# Patient Record
Sex: MALE | Race: ASIAN | NOT HISPANIC OR LATINO | Employment: FULL TIME | URBAN - METROPOLITAN AREA
[De-identification: names, ages, dates, MRNs, and addresses within clinical notes are randomized per-mention and may not be internally consistent; named-entity substitution may affect disease eponyms.]

---

## 2020-07-06 ENCOUNTER — HOSPITAL ENCOUNTER (OUTPATIENT)
Dept: GENERAL RADIOLOGY | Facility: HOSPITAL | Age: 50
Discharge: HOME | End: 2020-07-06
Admitting: INTERNAL MEDICINE

## 2020-07-06 ENCOUNTER — TRANSCRIBE ORDERS (OUTPATIENT)
Dept: ADMINISTRATIVE | Facility: HOSPITAL | Age: 50
End: 2020-07-06

## 2020-07-06 DIAGNOSIS — R09.1 PLEURISY: ICD-10-CM

## 2020-07-06 DIAGNOSIS — R09.1 PLEURISY: Primary | ICD-10-CM

## 2020-07-06 DIAGNOSIS — R42 VERTIGO: ICD-10-CM

## 2020-07-06 PROCEDURE — 71046 X-RAY EXAM CHEST 2 VIEWS: CPT

## 2020-07-30 ENCOUNTER — HOSPITAL ENCOUNTER (OUTPATIENT)
Dept: CARDIOLOGY | Facility: HOSPITAL | Age: 50
Discharge: HOME | End: 2020-07-30
Admitting: INTERNAL MEDICINE

## 2020-07-30 DIAGNOSIS — R42 VERTIGO: ICD-10-CM

## 2020-07-30 LAB
BH CV XLRA MEAS LEFT CCA RATIO VEL: 77.9 CM/SEC
BH CV XLRA MEAS LEFT DIST CCA EDV: 24.1 CM/SEC
BH CV XLRA MEAS LEFT DIST CCA PSV: 62.6 CM/SEC
BH CV XLRA MEAS LEFT DIST ICA EDV: 40.6 CM/SEC
BH CV XLRA MEAS LEFT DIST ICA PSV: 77.9 CM/SEC
BH CV XLRA MEAS LEFT ICA RATIO VEL: 54.5 CM/SEC
BH CV XLRA MEAS LEFT ICA/CCA RATIO: 0.7
BH CV XLRA MEAS LEFT MID CCA EDV: 25.2 CM/SEC
BH CV XLRA MEAS LEFT MID CCA PSV: 77.9 CM/SEC
BH CV XLRA MEAS LEFT MID ICA EDV: 27.8 CM/SEC
BH CV XLRA MEAS LEFT MID ICA PSV: 54.5 CM/SEC
BH CV XLRA MEAS LEFT PROX CCA EDV: 25.2 CM/SEC
BH CV XLRA MEAS LEFT PROX CCA PSV: 82.3 CM/SEC
BH CV XLRA MEAS LEFT PROX ECA EDV: 17.6 CM/SEC
BH CV XLRA MEAS LEFT PROX ECA PSV: 59.7 CM/SEC
BH CV XLRA MEAS LEFT PROX ICA EDV: 20 CM/SEC
BH CV XLRA MEAS LEFT PROX ICA PSV: 52.1 CM/SEC
BH CV XLRA MEAS LEFT PROX SCLA EDV: 5.5 CM/SEC
BH CV XLRA MEAS LEFT PROX SCLA PSV: 85.6 CM/SEC
BH CV XLRA MEAS LEFT VERTEBRAL A EDV: 10.4 CM/SEC
BH CV XLRA MEAS LEFT VERTEBRAL A PSV: 31 CM/SEC
BH CV XLRA MEAS RIGHT CCA RATIO VEL: 91.9 CM/SEC
BH CV XLRA MEAS RIGHT DIST CCA EDV: 31.1 CM/SEC
BH CV XLRA MEAS RIGHT DIST CCA PSV: 88.2 CM/SEC
BH CV XLRA MEAS RIGHT DIST ICA EDV: 31.5 CM/SEC
BH CV XLRA MEAS RIGHT DIST ICA PSV: 71.8 CM/SEC
BH CV XLRA MEAS RIGHT ICA RATIO VEL: 78.6 CM/SEC
BH CV XLRA MEAS RIGHT ICA/CCA RATIO: 0.86
BH CV XLRA MEAS RIGHT MID CCA EDV: 26.7 CM/SEC
BH CV XLRA MEAS RIGHT MID CCA PSV: 91.9 CM/SEC
BH CV XLRA MEAS RIGHT MID ICA EDV: 36.4 CM/SEC
BH CV XLRA MEAS RIGHT MID ICA PSV: 73.7 CM/SEC
BH CV XLRA MEAS RIGHT PROX CCA EDV: 26.1 CM/SEC
BH CV XLRA MEAS RIGHT PROX CCA PSV: 80.1 CM/SEC
BH CV XLRA MEAS RIGHT PROX ECA EDV: 17.7 CM/SEC
BH CV XLRA MEAS RIGHT PROX ECA PSV: 87.5 CM/SEC
BH CV XLRA MEAS RIGHT PROX ICA EDV: 23.1 CM/SEC
BH CV XLRA MEAS RIGHT PROX ICA PSV: 53.6 CM/SEC
BH CV XLRA MEAS RIGHT PROX SCLA EDV: 10.6 CM/SEC
BH CV XLRA MEAS RIGHT PROX SCLA PSV: 85.1 CM/SEC
BH CV XLRA MEAS RIGHT VERTEBRAL A EDV: 16 CM/SEC
BH CV XLRA MEAS RIGHT VERTEBRAL A PSV: 45.6 CM/SEC
LEFT ARM BP: NORMAL MMHG
RIGHT ARM BP: NORMAL MMHG

## 2020-07-30 PROCEDURE — 93880 EXTRACRANIAL BILAT STUDY: CPT | Performed by: INTERNAL MEDICINE

## 2020-07-30 PROCEDURE — 93880 EXTRACRANIAL BILAT STUDY: CPT

## 2020-08-17 ENCOUNTER — OFFICE VISIT (OUTPATIENT)
Dept: ORTHOPEDIC SURGERY | Facility: CLINIC | Age: 50
End: 2020-08-17

## 2020-08-17 VITALS — BODY MASS INDEX: 23.4 KG/M2 | OXYGEN SATURATION: 99 % | WEIGHT: 145.6 LBS | HEIGHT: 66 IN | HEART RATE: 67 BPM

## 2020-08-17 DIAGNOSIS — M25.511 RIGHT SHOULDER PAIN, UNSPECIFIED CHRONICITY: Primary | ICD-10-CM

## 2020-08-17 PROCEDURE — 99203 OFFICE O/P NEW LOW 30 MIN: CPT | Performed by: ORTHOPAEDIC SURGERY

## 2020-08-17 NOTE — PROGRESS NOTES
[facility] Note    Subjective     Chief Complaint   Patient presents with   • Right Shoulder - Pain        HPI    Farhat Thorne is a 50 y.o. male who presents with right shoulder pain.  Onset: atraumatic and gradual in nature. The issue has been ongoing for 1.5 year(s). Pain is a 3/10 on the pain scale. Pain is described as dull. Associated symptoms include grinding. The pain is worse with leisure. Previous treatments have included: nothing.    I have reviewed the following portions of the patient's history:History of Present Illness    He lives in [place].    History reviewed. No pertinent past medical history.   Past Surgical History:   Procedure Laterality Date   • APPENDECTOMY        Family History   Problem Relation Age of Onset   • Diabetes Mother    • Hypertension Father    • Diabetes Father    • Heart attack Father      Social History     Socioeconomic History   • Marital status:      Spouse name: Not on file   • Number of children: Not on file   • Years of education: Not on file   • Highest education level: Not on file   Tobacco Use   • Smoking status: Former Smoker     Years: 0.00     Types: Cigarettes   • Smokeless tobacco: Never Used   Substance and Sexual Activity   • Alcohol use: Never     Frequency: Never   • Drug use: Never   • Sexual activity: Defer      No current outpatient medications on file prior to visit.     No current facility-administered medications on file prior to visit.       No Known Allergies     The following portions of the patient's history were reviewed and updated as appropriate: allergies, current medications, past family history, past medical history, past social history, past surgical history and problem list.    Review of Systems   Constitutional: Negative.    HENT: Negative.    Eyes: Negative.    Respiratory: Negative.    Cardiovascular: Negative.    Gastrointestinal: Negative.    Endocrine: Negative.    Genitourinary: Negative.   "  Musculoskeletal: Positive for arthralgias and joint swelling.   Skin: Negative.    Allergic/Immunologic: Negative.    Neurological: Negative.    Hematological: Negative.    Psychiatric/Behavioral: Negative.         Objective      Physical Exam  Pulse 67   Ht 168.5 cm (66.34\")   Wt 66 kg (145 lb 9.6 oz)   SpO2 99%   BMI 23.26 kg/m²     Body mass index is 23.26 kg/m².    GENERAL APPEARANCE: awake, alert & oriented x 3, in no acute distress and well developed, well nourished  PSYCH: normal mood and affect  LUNGS:  breathing nonlabored, no wheezing  EYES: sclera anicteric, pupils equal  CARDIOVASCULAR: palpable pulses dorsalis pedis, palpable posterior tibial bilaterally. Capillary refill less than 2 seconds  INTEGUMENTARY: skin intact, no clubbing, cyanosis  NEUROLOGIC:  Normal Sensation and reflexes       Ortho Exam  Musculoskeletal   Upper Extremity   Right Shoulder     Inspection and Palpation:     Tenderness - none    Crepitus - none    Sensation is normal    Examination reveals no ecchymosis.      Strength and Tone:    Supraspinatus,  Infraspinatus - 5/5    Subscapularis - 5/5    Deltoid - 5/5  Range of Motion   Left shoulder:    Internal Rotation: ROM - T7    External Rotation: AROM - 80 degrees    Elevation through flexion: AROM - 180 degrees    Right Shoulder:    Internal Rotation: ROM - T7    External Rotation: AROM - 80 degrees    Elevation through flexion: AROM - 180 degrees     Abduction -180 degrees  Instability   Right shoulder    Sulcus sign negative    Apprehension test negative    David relocation test negative    Jerk test negative   Impingement   Right shoulder    Danielson impingement test positive    Neer impingement test positive   Functional Testing   Right shoulder    AC crossover adduction test negative    Abdominal compression test negative    Lift-off sign negative    Speed's test negative    Bah's test negative    Horriblower's sign negative       Imaging/Studies  Imaging Results (Last " 7 Days)     Procedure Component Value Units Date/Time    XR Shoulder 2+ View Right [[id number]] Resulted:  08/17/20 1522     Updated:  08/17/20 1523    Narrative:       Right Shoulder X-Ray  Indication: Pain  AP, scapular Y, and axillary lateral views    Findings:  No fracture  No bony lesion  Normal soft tissues  Normal joint spaces    No prior studies were available for comparison.            Assessment/Plan        ICD-10-CM ICD-9-CM   1. Right shoulder pain, unspecified chronicity M25.511 719.41       Orders Placed This Encounter   Procedures   • XR Shoulder 2+ View Right   • Ambulatory Referral to Physical Therapy Evaluate and treat, Ortho      I have ordered physical therapy.  I will order Voltaren gel.  He will follow-up in 3 to 4 weeks.  If not better we will get an MRI.  He says it does not hurt enough for oral medication  Medical Decision Making  Management Options : prescription/IM medicine and physical/occupational therapy  Data/Risk: radiology tests and independent visualization of imaging, lab tests, or EMG/NCV    Francois Mendoza MD  08/17/20  15:37         EMR Dragon/Transcription disclaimer:  Much of this encounter note is an electronic transcription of spoken language to printed text. Electronic transcription of spoken language may permit erroneous, or at times, nonsensical words or phrases to be inadvertently transcribed. Although I have reviewed the note for such errors, some may still exist.

## 2020-09-11 ENCOUNTER — TREATMENT (OUTPATIENT)
Dept: PHYSICAL THERAPY | Facility: CLINIC | Age: 50
End: 2020-09-11

## 2020-09-11 DIAGNOSIS — M25.511 RIGHT SHOULDER PAIN, UNSPECIFIED CHRONICITY: Primary | ICD-10-CM

## 2020-09-11 PROCEDURE — 97162 PT EVAL MOD COMPLEX 30 MIN: CPT | Performed by: PHYSICAL THERAPIST

## 2020-09-11 PROCEDURE — 97110 THERAPEUTIC EXERCISES: CPT | Performed by: PHYSICAL THERAPIST

## 2020-09-11 NOTE — PROGRESS NOTES
Physical Therapy Initial Evaluation Note    Date: 2020    Patient: Farhat Thorne  : 1970  Medical Record #: [id number]  Referring Provider: Francois Mendoza MD    Visit Diagnosis/ICD-10 Code: Right shoulder pain, unspecified chronicity [M25.511]  Patient seen for 1 sessions    Subjective   Functional Outcome Measure: QuickDash    Subjective Evaluation    History of Present Illness  Date of onset: 2018  Mechanism of injury: Pt reports insidious onset of right shoulder pain about 2 years ago, denies mechanism of injury. He states pain only with end range horizontal adduction and end range ER. Pt verbalized vague discomfort symptoms, reports pain is relieved with rest. He states he has recently picked up tennis as hobby, playing 2x/week for hours at a time.       Subjective comment: Pt presents with c/o right shoulder pain.   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 Pt states he tries to do 20 push ups from floor daily.  Quality of life: good    Pain  Current pain ratin  At best pain ratin  Quality: dull ache  Relieving factors: change in position  Progression: no change    Hand dominance: right    Diagnostic Tests  X-ray: normal    Patient Goals  Patient goals for therapy: decreased pain             Objective   Objective          Postural Observations  Seated posture: good  Standing posture: good        Palpation     Right Tenderness of the teres minor.     Tenderness     Right Shoulder  Tenderness in the biceps tendon (proximal) and bicipital groove.     Cervical/Thoracic Screen   Cervical range of motion within normal limits    Neurological Testing     Sensation     Shoulder   Left Shoulder   Intact: light touch    Right Shoulder   Intact: light touch    Active Range of Motion   Left Shoulder   Flexion: 180 degrees   Abduction: 180 degrees   External rotation BTH: T1   Internal rotation BTB: T4      Right Shoulder   Flexion: 180 degrees   Abduction: 180 degrees   External rotation 0°: WFL  External rotation BTH: T1   Internal rotation BTB: T8     Scapular Mobility     Right Shoulder   Scapular mobility: good  Scapular Mobility with Shoulder to 90° FF   Scapular winging: minimal    Scapular Mobility beyond 90° FF   Scapular winging: moderate    Strength/Myotome Testing     Left Shoulder   Normal muscle strength    Right Shoulder     Planes of Motion   Flexion: 5   Abduction: 5   External rotation at 0°: 4+   Internal rotation at 0°: 4+     Isolated Muscles   Biceps: 5     Tests   Cervical     Right   Negative active compression ([place]).     Right Shoulder   Positive Hawkin's and Neer's.   Negative AC shear, drop arm, empty can and Speed's.         Treatment/Procedures/Modalities:   Manual Therapy: 0 Minutes  Therapeutic Exercise: 12 Minutes   Neuromuscular Re-Education: 0 Minutes   Therapeutic Activity: 0 Minutes  Gait Trainin Minutes   Moist Heat:    Ice:    E-Stim:    Ultrasound:    Ionto:   Traction:      Timed Code Treatment: 50 Minutes  Total Treatment Time: 50 Minutes    Assessment / Plan:     Assessment & Plan     Assessment  Impairments: activity intolerance, lacks appropriate home exercise program and pain with function  Assessment details: Pt is a 49 yo male referred to PT for right shoulder pain who demonstrates full functional AROM; however mild scapular winging noted, pain with end range ER and horizontal adduction. Symptoms could be related to mechanical impingement and mild bicipital tendinitis. Pt would benefit from RC strengthening and scapular stabilization exercises.   Prognosis: good  Functional Limitations: carrying objects, pushing and unable to perform repetitive tasks  Goals  Plan Goals: Short Term Goals (3 wks)  1. Patient will be independent and compliant with initial home exercise program.   2. Pt will tolerate performing 15 push ups from floor without right shoulder pain.      Long Term Goals (6 wks)  1. Pt. will be independent and compliant with advanced home exercise program to facilitate self-management of symptoms.  2. Patient will improve Quick Dash score by 5 points to demonstrate improved function of daily tasks.  3. Pt will tolerate playing tennis for 2 hours without reproduction of right shoulder pain.       Plan  Therapy options: will be seen for skilled physical therapy services  Planned modality interventions: cryotherapy, electrical stimulation/Russian stimulation, thermotherapy (hydrocollator packs) and ultrasound  Planned therapy interventions: body mechanics training, flexibility, functional ROM exercises, home exercise program, joint mobilization, manual therapy, neuromuscular re-education, postural training, soft tissue mobilization, spinal/joint mobilization, strengthening and stretching  Frequency: 1x week  Duration in visits: 4  Treatment plan discussed with: patient  Plan details: Pt educated on HEP, given online copy and BTB. Progress scapular stabilization prone series exercises next visit along with RC strengthening as tolerated.         Progress per Plan of Care    Initial Certification  Certification Period: 12/10/2020  I certify that the therapy services are furnished while this patient is under my care.  The services outlined above are required by this patient, and will be reviewed every 90 days.    Corinne E. Perkins, PT  Physical Therapist    Please sign and return via fax to [phone].. Thank you, [facility] Physical Therapy.

## 2020-09-11 NOTE — PATIENT INSTRUCTIONS
Access Code: [email or web address]   URL: [email or web address]   Date: 09/11/2020   Prepared by: Corinne Perkins     Exercises  Seated Scapular Retraction - 10 reps - 3 sets - 1x daily - 7x weekly  Standing Shoulder Row with Anchored Resistance - 15 reps - 2 sets - 1x daily - 4-5x weekly  Standing Low Shoulder Row with Anchored Resistance - 2 sets - 15 reps - 1x daily - 4-5x weekly  Shoulder External Rotation and Scapular Retraction with Resistance - 10 reps - 2 sets - 1x daily - 4-5x weekly  Doorway Pec Stretch at 60 Elevation - 3 reps - 15-20 sec hold - 1x daily    BTB

## 2020-10-20 ENCOUNTER — TREATMENT (OUTPATIENT)
Dept: PHYSICAL THERAPY | Facility: CLINIC | Age: 50
End: 2020-10-20

## 2020-10-20 DIAGNOSIS — M25.511 RIGHT SHOULDER PAIN, UNSPECIFIED CHRONICITY: Primary | ICD-10-CM

## 2020-10-20 PROCEDURE — 97530 THERAPEUTIC ACTIVITIES: CPT | Performed by: PHYSICAL THERAPIST

## 2020-10-20 PROCEDURE — 97110 THERAPEUTIC EXERCISES: CPT | Performed by: PHYSICAL THERAPIST

## 2020-10-20 NOTE — PROGRESS NOTES
Physical Therapy Daily Progress Note    Date: 10/20/2020    Patient: Farhat Thorne  Medical Record #: [id number]  Referring Provider: Francois Mendoza MD    Visit Diagnosis/ICD-10 Code: Right shoulder pain, unspecified chronicity [M25.511]  Patient seen for 2 sessions    Subjective   Patient states he has been doing his exercises at home, improving with less pain overall. He reports he stopped playing tennis and hasn't been doing push ups lately, with noted improvement as well.     Pain Scale (0-10): 2/10      Objective    Flexion WFL, ER WFL AROM but weakness ER pain free; IR WFL AROM and strength.   See Exercise, Manual and Modality logs for complete treatment.    Objective          Postural Observations  Seated posture: good  Standing posture: good        Tenderness     Right Shoulder  No tenderness in the biceps tendon (proximal) and bicipital groove.     Additional Tenderness Details  Patient denies tenderness to palpate.    Cervical/Thoracic Screen   Cervical range of motion within normal limits    Neurological Testing     Sensation     Shoulder   Left Shoulder   Intact: light touch    Right Shoulder   Intact: light touch    Active Range of Motion   Left Shoulder   Flexion: 180 degrees   Abduction: 180 degrees   External rotation BTH: T1   Internal rotation BTB: T4     Right Shoulder   Flexion: 180 degrees   Abduction: 180 degrees   External rotation 0°: WFL  External rotation BTH: T1   Internal rotation BTB: T8     Scapular Mobility     Right Shoulder   Scapular mobility: good  Scapular Mobility with Shoulder to 90° FF   Scapular winging: minimal    Scapular Mobility beyond 90° FF   Scapular winging: moderate    Strength/Myotome Testing     Left Shoulder   Normal muscle strength    Right Shoulder     Planes of Motion   Flexion: 5   Abduction: 5   External rotation at 0°: 4+   Internal rotation at 0°: 4+     Isolated Muscles   Biceps: 5     Tests   Cervical     Right   Negative active compression  ([place]).     Right Shoulder   Positive Hawkin's and Neer's.   Negative AC shear, drop arm, empty can and Speed's.         Treatment/Procedures/Modalities:   Manual Therapy: 0 Minutes  Therapeutic Exercise: 32 Minutes   Neuromuscular Re-Education: 0 Minutes   Therapeutic Activity: 10 Minutes  Gait Trainin Minutes   Moist Heat:    Ice:    E-Stim:    Ultrasound:    Ionto:   Traction:      Timed Code Treatment: 42 Minutes  Total Treatment Time: 42 Minutes    Assessment / Plan:   Assessment & Plan     Assessment  Impairments: activity intolerance and pain with function  Assessment details: Reassessment completed today in clinic for patient with right shoulder pain related to mechanical impingement syndrome. Symptoms of bicipital tendinitis have resolved. Pt demonstrates functional, right shoulder AROM; however continues to have scapular retraction and shoulder ER weakness. Pt has been seen for PT eval only prior to today's reassessment, tolerates treatment well and verbalized understanding of progression of HEP.   Prognosis: good  Functional Limitations: carrying objects, pushing and unable to perform repetitive tasks  Goals  Plan Goals: Short Term Goals (3 wks)  1. Patient will be independent and compliant with initial home exercise program. MET  2. Pt will tolerate performing 15 push ups from floor without right shoulder pain. ONGOING; able to perform 5 from floor pain free.     Long Term Goals (6 wks)  1. Pt. will be independent and compliant with advanced home exercise program to facilitate self-management of symptoms. MET  2. Patient will improve Quick Dash score by 5 points to demonstrate improved function of daily tasks. ONGOING  3. Pt will tolerate playing tennis for 2 hours without reproduction of right shoulder pain. ONGOING      Plan  Therapy options: will be seen for skilled physical therapy services  Planned modality interventions: cryotherapy, electrical stimulation/Russian stimulation, thermotherapy  (hydrocollator packs) and ultrasound  Planned therapy interventions: body mechanics training, flexibility, functional ROM exercises, home exercise program, joint mobilization, manual therapy, neuromuscular re-education, postural training, soft tissue mobilization, spinal/joint mobilization, strengthening and stretching  Frequency: 1x week  Duration in visits: 4  Treatment plan discussed with: patient  Plan details: Pt educated on progression of HEP, given online copy and GTB. Progress scapular stabilization prone series exercises next visit along with RC strengthening as tolerated. Progress ER at varied angles next visit along with diagonals as tolerated for functional strengthening.         Progress per Plan of Care.       Corinne E. Perkins, PT  Physical Therapist

## 2020-10-20 NOTE — PATIENT INSTRUCTIONS
Access Code: [email or web address]   URL: [email or web address]   Date: 10/20/2020   Prepared by: Corinne Perkins     Exercises   Standing Shoulder Row with Anchored Resistance - 15 reps - 2 sets - 1x daily - 4-5x weekly   Standing Low Shoulder Row with Anchored Resistance - 2 sets - 15 reps - 1x daily - 4-5x weekly   Shoulder External Rotation and Scapular Retraction with Resistance - 15 reps - 2 sets - 1x daily - 4-5x weekly   Shoulder External Rotation with Anchored Resistance - 15 reps - 2 sets - 1x daily - 4-5x weekly   Sidelying Shoulder External Rotation Dumbbell - 15 reps - 2 sets - 1x daily - 4-5x weekly   Push Up - 10 reps - 3 sets - 1x daily - 7x weekly

## 2020-12-09 ENCOUNTER — TREATMENT (OUTPATIENT)
Dept: PHYSICAL THERAPY | Facility: CLINIC | Age: 50
End: 2020-12-09

## 2020-12-09 DIAGNOSIS — M25.511 RIGHT SHOULDER PAIN, UNSPECIFIED CHRONICITY: Primary | ICD-10-CM

## 2020-12-09 PROCEDURE — 97110 THERAPEUTIC EXERCISES: CPT | Performed by: PHYSICAL THERAPIST

## 2020-12-09 PROCEDURE — 97530 THERAPEUTIC ACTIVITIES: CPT | Performed by: PHYSICAL THERAPIST

## 2020-12-09 NOTE — PROGRESS NOTES
Physical Therapy Re- Assessment/Certification Note    Date: 2020    Patient: Farhat Thorne  : 1970  Medical Record #: [id number]  Referring Provider: Francois Mendoza MD    Visit Diagnosis/ICD-10 Code: Right shoulder pain, unspecified chronicity [M25.511]  Patient seen for 3 sessions    Subjective   Patient states his right shoulder pain is about the same since his last visit in Oct. He states he has limited his activity, stopped playing tennis and only doing 10 push ups daily to prevent more pain. Pt states he notices pain only at end range of motion and when 'over extending' into some positions along with attempted pull ups at home. Pt states he has been doing exercises at home with theraband as prescribed.     Pain Scale (0-10): 2/10  Functional Outcome Measure: QuickDASH: 20.45%  Clinical Progress: unchanged  Home Program Compliance: Yes  Treatment has included: therapeutic exercise, manual therapy and therapeutic activity    Objective          Postural Observations  Seated posture: good  Standing posture: good        Tenderness     Right Shoulder  No tenderness in the biceps tendon (proximal) and bicipital groove.     Additional Tenderness Details  Patient denies tenderness to palpate.    Cervical/Thoracic Screen   Cervical range of motion within normal limits    Neurological Testing     Sensation     Shoulder   Left Shoulder   Intact: light touch    Right Shoulder   Intact: light touch    Active Range of Motion   Left Shoulder   Flexion: 180 degrees   Abduction: 180 degrees   External rotation BTH: T1   Internal rotation BTB: T4     Right Shoulder   Flexion: 180 degrees   Abduction: 180 degrees   External rotation 0°: WFL  External rotation BTH: T1   Internal rotation BTB: T8     Additional Active Range of Motion Details  Mild pain end range abduction and flexion, rated 1-2/10.     Passive Range of Motion     Right Shoulder   Normal passive range of motion    Scapular Mobility     Right  Shoulder   Scapular mobility: good  Scapular Mobility with Shoulder to 90° FF   Scapular winging: minimal    Scapular Mobility beyond 90° FF   Scapular winging: minimal    Strength/Myotome Testing     Left Shoulder   Normal muscle strength    Right Shoulder     Planes of Motion   Flexion: 5   Extension: 4+   Abduction: 5   External rotation at 0°: 4+   Internal rotation at 0°: 5     Isolated Muscles   Biceps: 5     Tests   Cervical     Right   Negative active compression ([place]).     Right Shoulder   Positive Hawkin's and Neer's.   Negative AC shear, drop arm, empty can and Speed's.         Treatment/Procedures/Modalities:   Manual Therapy: 5 Minutes  Therapeutic Exercise: 30 Minutes   Neuromuscular Re-Education: 0 Minutes   Therapeutic Activity: 8 Minutes  Gait Trainin Minutes   Moist Heat:    Ice:    E-Stim:    Ultrasound:    Ionto:   Traction:      Timed Code Treatment: 43 Minutes  Total Treatment Time: 43 Minutes    Goals:   Progress toward previous goals: Partially Met   Recommendations: Continue as planned  Timeframe: 1 month  Prognosis to achieve goals: fair    Assessment & Plan     Assessment  Impairments: activity intolerance and pain with function  Assessment details: Reassessment completed today in clinic for patient with right shoulder pain related to mechanical impingement syndrome. Patient has maintained current HEP with green and blue theraband, demonstrates functional shoulder AROM, and is progressing with improved scapular stabilization; however upon fatigue with motion, has UT compensation with impingement. He continues to have pain end range flexion, abduction and IR, resolves with rest. Pt educated on progression of exercises for home, educated on shoulder mechanics and position to avoid with lifting to increase overall activity per patient goal.   Prognosis: good  Functional Limitations: carrying objects, pushing and unable to perform repetitive tasks  Goals  Plan Goals: Short Term Goals  (3 wks)  1. Patient will be independent and compliant with initial home exercise program. MET  2. Pt will tolerate performing 15 push ups from floor without right shoulder pain. ONGOING; able to perform 10 from floor pain free.     Long Term Goals (6 wks)  1. Pt. will be independent and compliant with advanced home exercise program to facilitate self-management of symptoms. MET, ongoing  2. Patient will improve Quick Dash score by 5 points to demonstrate improved function of daily tasks. ONGOING  3. Pt will tolerate playing tennis for 2 hours without reproduction of right shoulder pain. ONGOING      Plan  Therapy options: will be seen for skilled physical therapy services  Planned modality interventions: cryotherapy, electrical stimulation/Russian stimulation, thermotherapy (hydrocollator packs) and ultrasound  Planned therapy interventions: body mechanics training, flexibility, functional ROM exercises, home exercise program, joint mobilization, manual therapy, neuromuscular re-education, postural training, soft tissue mobilization, spinal/joint mobilization, strengthening and stretching  Frequency: 1x week  Duration in visits: 2  Treatment plan discussed with: patient  Plan details: Pt has not f/u with therapy as expected 1x/week, has only had 2 treatment/reassessments months apart since evaluation. He verbalized compliance with current HEP but has had limited progress today. Progress scapular stabilization exercises in quadruped position next visit. Discuss f/u with Dr. Mendoza if lack of progress persists.         Progress strengthening /stabilization /functional activity. Anticipate d/c next visit.     I certify that the therapy services are furnished while this patient is under my care.  The services outlined above are required by this patient, and will be reviewed every 90 days.    Corinne E. Perkins, PT  Physical Therapist    Please sign and return via fax to [phone].. Thank you, [facility] Physical  Therapy.

## 2021-04-08 ENCOUNTER — TRANSCRIBE ORDERS (OUTPATIENT)
Dept: PHYSICAL THERAPY | Facility: CLINIC | Age: 51
End: 2021-04-08

## 2021-04-08 ENCOUNTER — TELEPHONE (OUTPATIENT)
Dept: ORTHOPEDIC SURGERY | Facility: CLINIC | Age: 51
End: 2021-04-08

## 2021-04-08 DIAGNOSIS — M25.511 RIGHT SHOULDER PAIN, UNSPECIFIED CHRONICITY: Primary | ICD-10-CM

## 2021-04-08 NOTE — TELEPHONE ENCOUNTER
Caller: NELSON CORRIGAN    Relationship: SELF    Best call back number: [phone]    What orders are you requesting (i.e. lab or imaging): PHYSICAL THERAPY FOR RT SHOULDER    In what timeframe would the patient need to come in: AS SOON AS POSSIBLE    Where will you receive your lab/imaging services: [facility] / [facility] PERKINS    Additional notes: PATIENT NEEDS PT ORDER [facility]XED TO [facility]  PHONE: [phone]  FAX: [phone]

## 2021-04-19 ENCOUNTER — TREATMENT (OUTPATIENT)
Dept: PHYSICAL THERAPY | Facility: CLINIC | Age: 51
End: 2021-04-19

## 2021-04-19 DIAGNOSIS — M25.511 CHRONIC RIGHT SHOULDER PAIN: Primary | ICD-10-CM

## 2021-04-19 DIAGNOSIS — G89.29 CHRONIC RIGHT SHOULDER PAIN: Primary | ICD-10-CM

## 2021-04-19 PROCEDURE — 97162 PT EVAL MOD COMPLEX 30 MIN: CPT | Performed by: PHYSICAL THERAPIST

## 2021-04-19 PROCEDURE — 97110 THERAPEUTIC EXERCISES: CPT | Performed by: PHYSICAL THERAPIST

## 2021-04-19 PROCEDURE — 97140 MANUAL THERAPY 1/> REGIONS: CPT | Performed by: PHYSICAL THERAPIST

## 2021-04-19 NOTE — PROGRESS NOTES
Physical Therapy Initial Evaluation and Plan of Care    Patient: Farhat Thorne   : 1970  Diagnosis/ICD-10 Code:  Chronic right shoulder pain [M25.511, G89.29]  Referring practitioner: Francois Mendoza MD    Subjective Evaluation    History of Present Illness  Date of onset: 3/20/2021  Mechanism of injury: Insidious onset    Subjective comment: Started having neck stiffness and right arm numbness and tingling into the 1st-3rd digits about 1 month ago.  Neck is less stiff, but still has numbness and tingling in his right arm and hand.  Denies night sweats, chiills and unexpected loss/gain weight in the past month.  Patient Occupation: IT for [employer]   Precautions and Work Restrictions: noneQuality of life: excellent    Pain  Alleviating factors: Foam rolling mid back; horz aBd with band; moving neck in all directions.  Exacerbated by: waking up 4x/night; looking up (tingling/numbness in right arm and hand.  Progression: improved    Social Support  Lives in: multiple-level home  Lives with: spouse    Hand dominance: right    Patient Goals  Patient goals for therapy: increased motion and return to sport/leisure activities (return to golf)             Objective    *13 cm difference in HBB (R UE)         Neurological Testing     Reflexes   Left   Biceps (C5/C6): normal (2+)  Brachioradialis (C6): normal (2+)  Triceps (C7): normal (2+)  Banuelos's reflex: negative    Right   Biceps (C5/C6): normal (2+)  Brachioradialis (C6): normal (2+)  Triceps (C7): normal (2+)  Banuelos's reflex: negative    Active Range of Motion   Cervical/Thoracic Spine   Cervical    Flexion: 45 degrees   Extension: 54 degrees   Left rotation: 70 degrees   Right rotation: 70 degrees     Additional Active Range of Motion Details  R rot; C/S ext->reproduce N/T in R UE and 1st-3rd digits    Strength/Myotome Testing     Left Shoulder     Planes of Motion   Flexion: 5   External rotation at 0°: 5   Internal rotation at 0°: 5     Right  Shoulder     Planes of Motion   Flexion: 5   External rotation at 0°: 5   Internal rotation at 0°: 5     Additional Strength Details  MMT:  Elbow flx/ext:  5/5 each B/L      QD:  22    Assessment & Plan     Assessment  Impairments: lacks appropriate home exercise program  Assessment details: Mr. Thorne is a 51 year old RHD male that presents to physical therapy with a 1 month history of radiculitis of the R UE.  PMH was covered during interview.  C/S AROM is WFL in all planes.  Neurological exam is unremarkable.  Has good strength throughout R UE mm.  Reproduction of N/T into 1st-3rd digits with overpressure applied to the C/S.  Will focus on reducing intervertebral stiffness of the C/S and C/S mm strength to reduce load on the nervous system.  Prognosis: good  Functional Limitations: sleeping and reaching behind back  Goals  Plan Goals: STGs:  1.)  QuickDASH improved x 1 MCID in 2 weeks.  2.)  Have no c/o of N/T in R UE in 4 weeks.  LTGs:  1.)  Return to playing golf without difficulty in 6 weeks.  2.)  Have no sleep disturbance in 6 weeks.    Plan  Therapy options: will be seen for skilled physical therapy services  Planned therapy interventions: manual therapy, therapeutic activities, home exercise program, abdominal trunk stabilization, stretching and strengthening  Frequency: 1x week  Duration in visits: 6  Duration in weeks: 6  Treatment plan discussed with: patient        Manual Therapy:    12     mins  11676;  Therapeutic Exercise:    14     mins  45921;     Neuromuscular Donte:        mins  02022;    Therapeutic Activity:          mins  93538;     Gait Training:           mins  72655;     Ultrasound:          mins  97506;    Electrical Stimulation:         mins  90285 ( );  Dry Needling          mins self-pay    Timed Treatment:   26   mins   Total Treatment:     45   mins    PT SIGNATURE: Xavier Campos, PT   DATE TREATMENT INITIATED: 4/19/2021    Initial Certification  Certification Period:  7/18/2021  I certify that the therapy services are furnished while this patient is under my care.  The services outlined above are required by this patient, and will be reviewed every 90 days.     PHYSICIAN: Francois Mendoza MD      DATE:     Please sign and return via fax to [phone].. Thank you, [facility].

## 2021-04-28 ENCOUNTER — TREATMENT (OUTPATIENT)
Dept: PHYSICAL THERAPY | Facility: CLINIC | Age: 51
End: 2021-04-28

## 2021-04-28 DIAGNOSIS — M54.2 CERVICALGIA: Primary | ICD-10-CM

## 2021-04-28 PROCEDURE — 97112 NEUROMUSCULAR REEDUCATION: CPT | Performed by: PHYSICAL THERAPIST

## 2021-04-28 PROCEDURE — 97530 THERAPEUTIC ACTIVITIES: CPT | Performed by: PHYSICAL THERAPIST

## 2021-04-28 PROCEDURE — 97140 MANUAL THERAPY 1/> REGIONS: CPT | Performed by: PHYSICAL THERAPIST

## 2021-04-28 PROCEDURE — 97110 THERAPEUTIC EXERCISES: CPT | Performed by: PHYSICAL THERAPIST

## 2021-04-28 NOTE — PROGRESS NOTES
Physical Therapy Daily Progress Note    Patient: Farhat Thorne   : 1970  Diagnosis/ICD-10 Code:  Cervicalgia [M54.2]  Referring practitioner: Francois Mendoza MD  Date of Initial Visit: Type: THERAPY  Noted: 2021  Today's Date: 2021  Patient seen for 2 sessions         Farhat Thorne reports that his neck is less stiff since he adjusted his pillow.  Still has symptoms down the side of the neck, in the bicep and into the 1st-3rd digits.  Not waking up at night time.    Subjective     Objective   See Exercise, Manual, and Modality Logs for complete treatment.   *C5/C6 DTR (bicep):  0/4    Assessment/Plan  Focused visit on improving C/S mobility, loading tolerance of the C/S and improving scapular mm activity.  Provided written/verbal instruction in updated HEP, printout for ergonomic setup at work and general education on how to manage R UE paresthesia.          Manual Therapy:    10     mins  75517;  Therapeutic Exercise:    12     mins  00600;     Neuromuscular Donte:    8    mins  06652;    Therapeutic Activity:     10     mins  11137;     Gait Training:           mins  32533;     Ultrasound:          mins  55611;    Electrical Stimulation:         mins  92145 (MC );  Dry Needling          mins self-pay    Timed Treatment:   40   mins   Total Treatment:     40   mins    Xavier Campos PT  Physical Therapist

## 2021-05-05 ENCOUNTER — TREATMENT (OUTPATIENT)
Dept: PHYSICAL THERAPY | Facility: CLINIC | Age: 51
End: 2021-05-05

## 2021-05-05 DIAGNOSIS — G89.29 CHRONIC RIGHT SHOULDER PAIN: Primary | ICD-10-CM

## 2021-05-05 DIAGNOSIS — M25.511 CHRONIC RIGHT SHOULDER PAIN: Primary | ICD-10-CM

## 2021-05-05 PROCEDURE — 97530 THERAPEUTIC ACTIVITIES: CPT | Performed by: PHYSICAL THERAPIST

## 2021-05-05 PROCEDURE — 97110 THERAPEUTIC EXERCISES: CPT | Performed by: PHYSICAL THERAPIST

## 2021-05-05 PROCEDURE — 97112 NEUROMUSCULAR REEDUCATION: CPT | Performed by: PHYSICAL THERAPIST

## 2021-05-05 PROCEDURE — 97140 MANUAL THERAPY 1/> REGIONS: CPT | Performed by: PHYSICAL THERAPIST

## 2021-05-05 NOTE — PROGRESS NOTES
Physical Therapy Daily Progress Note    Patient: Farhat Thorne   : 1970  Diagnosis/ICD-10 Code:  No primary diagnosis found.  Referring practitioner: Francois Mendoza MD  Date of Initial Visit: Type: THERAPY  Noted: 2021  Today's Date: 2021  Patient seen for 3 sessions         Farhat Thorne reports that he less numbness in his thumb.  No longer has numbness or tingling in the index or long finger.      Subjective     Objective   See Exercise, Manual, and Modality Logs for complete treatment.       Assessment/Plan  Focused visit on reducing C/S stiffness, improving anterior trunk/cervical mm strength and loading up the R GHJ.         Manual Therapy:    9     mins  50672;  Therapeutic Exercise:    15     mins  86471;     Neuromuscular Donte:    10    mins  00154;    Therapeutic Activity:     10     mins  70100;     Gait Training:           mins  32650;     Ultrasound:          mins  70173;    Electrical Stimulation:        mins  22366 (MC );  Dry Needling          mins self-pay    Timed Treatment:   44   mins   Total Treatment:     44   mins    Xavier Campos PT  Physical Therapist

## 2021-05-14 ENCOUNTER — TREATMENT (OUTPATIENT)
Dept: PHYSICAL THERAPY | Facility: CLINIC | Age: 51
End: 2021-05-14

## 2021-05-14 DIAGNOSIS — M54.2 CERVICALGIA: Primary | ICD-10-CM

## 2021-05-14 PROCEDURE — 97140 MANUAL THERAPY 1/> REGIONS: CPT | Performed by: PHYSICAL THERAPIST

## 2021-05-14 PROCEDURE — 97110 THERAPEUTIC EXERCISES: CPT | Performed by: PHYSICAL THERAPIST

## 2021-05-14 PROCEDURE — 97530 THERAPEUTIC ACTIVITIES: CPT | Performed by: PHYSICAL THERAPIST

## 2021-06-11 ENCOUNTER — TREATMENT (OUTPATIENT)
Dept: PHYSICAL THERAPY | Facility: CLINIC | Age: 51
End: 2021-06-11

## 2021-06-11 DIAGNOSIS — G89.29 CHRONIC RIGHT SHOULDER PAIN: Primary | ICD-10-CM

## 2021-06-11 DIAGNOSIS — M25.511 CHRONIC RIGHT SHOULDER PAIN: Primary | ICD-10-CM

## 2021-06-11 PROCEDURE — 97530 THERAPEUTIC ACTIVITIES: CPT | Performed by: PHYSICAL THERAPIST

## 2021-06-11 PROCEDURE — 97140 MANUAL THERAPY 1/> REGIONS: CPT | Performed by: PHYSICAL THERAPIST

## 2021-06-11 PROCEDURE — 97110 THERAPEUTIC EXERCISES: CPT | Performed by: PHYSICAL THERAPIST

## 2021-06-11 NOTE — PROGRESS NOTES
Physical Therapy Daily Progress Note    Patient: Farhat Thorne   : 1970  Diagnosis/ICD-10 Code:  Chronic right shoulder pain [M25.511, G89.29]  Referring practitioner: Francois Mendoza MD  Date of Initial Visit: Type: THERAPY  Noted: 2021  Today's Date: 2021  Patient seen for 5 sessions         Farhat Thorne reports denies tingling in the fingers.  Neck is just stiff at this time.  Has some soreness along the right shoulder blade region.    Objective   See Exercise, Manual, and Modality Logs for complete treatment.       Assessment/Plan  Focused visit on improving C/S stiffness, C/S mm strength and trunk strength.  Will f/u one more visit to ensure C/S stiffness is decreasing and will D/C to HEP.    Updated HEP with written and verbal instruction (included in TE billing below).    Educated patient in use of trunk exercises every hour while working to decrease load in the thoracic spine region due to sustained positioning/postures during work related tasks (billed as TA below).         Manual Therapy:    10     mins  04848;  Therapeutic Exercise:    24     mins  82640;     Neuromuscular Donte:        mins  08486;    Therapeutic Activity:     10     mins  38652;     Gait Training:           mins  93561;     Ultrasound:          mins  00779;    Electrical Stimulation:         mins  68672 ( );  Dry Needling          mins self-pay    Timed Treatment:   44   mins   Total Treatment:     44   mins    Xavier Campos PT  Physical Therapist

## 2021-06-23 ENCOUNTER — TREATMENT (OUTPATIENT)
Dept: PHYSICAL THERAPY | Facility: CLINIC | Age: 51
End: 2021-06-23

## 2021-06-23 DIAGNOSIS — M25.511 CHRONIC RIGHT SHOULDER PAIN: Primary | ICD-10-CM

## 2021-06-23 DIAGNOSIS — G89.29 CHRONIC RIGHT SHOULDER PAIN: Primary | ICD-10-CM

## 2021-06-23 PROCEDURE — 97530 THERAPEUTIC ACTIVITIES: CPT | Performed by: PHYSICAL THERAPIST

## 2021-06-23 PROCEDURE — 97140 MANUAL THERAPY 1/> REGIONS: CPT | Performed by: PHYSICAL THERAPIST

## 2021-06-23 PROCEDURE — 97110 THERAPEUTIC EXERCISES: CPT | Performed by: PHYSICAL THERAPIST

## 2021-06-23 NOTE — PROGRESS NOTES
Physical Therapy Daily Progress Note    Patient: Farhat Thorne   : 1970  Diagnosis/ICD-10 Code:  Chronic right shoulder pain [M25.511, G89.29]  Referring practitioner: Francois Mendoza MD  Date of Initial Visit: Type: THERAPY  Noted: 2021  Today's Date: 2021  Patient seen for 6 sessions         Farhat Thorne reports having no numbness or tingling in the right arm or hand.  Shoulder pain is gone.  Reports having stiffness in the right side of the neck and right shoulder blade region.  Otherwise, feels much better since starting physical therapy.    Subjective     Objective   See Exercise, Manual, and Modality Logs for complete treatment.     *QD:    *AROM C/S flx/ext/R rot/L rot:  50 deg/45 deg/80 deg/80 deg:  (+) stiffness into C/S flx and B/L rotation in R lateral C/S and R medial scapular region    Assessment/Plan  Mr. Thorne has attended physical therapy for a total of 6 visits for previous symptoms likely related to a C/S radiculitis affecting the R UE.  All peripheral symptoms have been eliminated.  At this time, has stiffness in the R lateral C/S and upper T/S region.  Focused exercises on improving tension loading tolerance of the R C/S and R T/S quadrant.  Will f/u in 2-3 weeks to re-assess stiffness in the C/S.      Updated HEP with written and verbal instruction (included in TE billed below).    Instructed patient to get away from the seated position every hour at work to reduce trunk stiffness.       Manual Therapy:    15     mins  65911;  Therapeutic Exercise:    10     mins  67097;     Neuromuscular Donte:        mins  29430;    Therapeutic Activity:     10     mins  04096;     Gait Training:           mins  74595;     Ultrasound:          mins  60276;    Electrical Stimulation:         mins  14470 ( );  Dry Needling          mins self-pay    Timed Treatment:   35   mins   Total Treatment:     40   mins    Xavier Campos PT  Physical Therapist

## 2021-07-12 ENCOUNTER — TREATMENT (OUTPATIENT)
Dept: PHYSICAL THERAPY | Facility: CLINIC | Age: 51
End: 2021-07-12

## 2021-07-12 DIAGNOSIS — M54.2 PAIN IN NECK: Primary | ICD-10-CM

## 2021-07-12 PROCEDURE — 97110 THERAPEUTIC EXERCISES: CPT | Performed by: PHYSICAL THERAPIST

## 2021-07-12 PROCEDURE — 97530 THERAPEUTIC ACTIVITIES: CPT | Performed by: PHYSICAL THERAPIST

## 2021-07-12 PROCEDURE — 97140 MANUAL THERAPY 1/> REGIONS: CPT | Performed by: PHYSICAL THERAPIST

## 2021-07-12 NOTE — PROGRESS NOTES
Physical Therapy Daily Progress Note    Patient: Farhat Thorne   : 1970  Diagnosis/ICD-10 Code:  Pain in neck [M54.2]  Referring practitioner: Francois Mendoza MD  Date of Initial Visit: Type: THERAPY  Noted: 2021  Today's Date: 2021  Patient seen for 7 sessions         Farhat Thorne reports that his neck is still stiff and feels like he has a knot in the medial scapular region. No numbness or tingling in the arm.  Denies shoulder pain.  Denies difficulty sleeping.      Subjective     Objective   See Exercise, Manual, and Modality Logs for complete treatment.       Assessment/Plan  Focused visit on reducing T/S sensitivity, posterior trunk mm activity and R GHJ mm endurance.  Symptoms are localized to the mid T/S and infrascapular region.  Will f/u with Mr. Thorne as needed if symptoms continue to linger in the next 2 weeks.    *Updated HEP with written and verbal instruction (included in total in HEP below).         Manual Therapy:    9     mins  32429;  Therapeutic Exercise:    15     mins  16402;     Neuromuscular Donte:        mins  23046;    Therapeutic Activity:     10     mins  36641;     Gait Training:           mins  82815;     Ultrasound:          mins  45808;    Electrical Stimulation:         mins  89072 ( );  Dry Needling         mins self-pay    Timed Treatment:   34   mins   Total Treatment:     40   mins    Xavier Campos PT  Physical Therapist

## 2021-07-29 ENCOUNTER — TRANSCRIBE ORDERS (OUTPATIENT)
Dept: ADMINISTRATIVE | Facility: HOSPITAL | Age: 51
End: 2021-07-29

## 2021-07-29 ENCOUNTER — APPOINTMENT (OUTPATIENT)
Dept: GENERAL RADIOLOGY | Facility: HOSPITAL | Age: 51
End: 2021-07-29

## 2021-07-29 DIAGNOSIS — M54.2 NECK PAIN, CHRONIC: Primary | ICD-10-CM

## 2021-07-29 DIAGNOSIS — G89.29 NECK PAIN, CHRONIC: Primary | ICD-10-CM
